# Patient Record
Sex: FEMALE | Race: WHITE | NOT HISPANIC OR LATINO
[De-identification: names, ages, dates, MRNs, and addresses within clinical notes are randomized per-mention and may not be internally consistent; named-entity substitution may affect disease eponyms.]

---

## 2023-01-17 ENCOUNTER — APPOINTMENT (OUTPATIENT)
Dept: ENDOCRINOLOGY | Facility: CLINIC | Age: 53
End: 2023-01-17
Payer: COMMERCIAL

## 2023-01-17 VITALS
BODY MASS INDEX: 24.66 KG/M2 | WEIGHT: 148 LBS | DIASTOLIC BLOOD PRESSURE: 69 MMHG | OXYGEN SATURATION: 100 % | HEART RATE: 73 BPM | HEIGHT: 65 IN | SYSTOLIC BLOOD PRESSURE: 109 MMHG

## 2023-01-17 DIAGNOSIS — N64.52 NIPPLE DISCHARGE: ICD-10-CM

## 2023-01-17 PROCEDURE — 99204 OFFICE O/P NEW MOD 45 MIN: CPT

## 2023-01-19 PROBLEM — N64.52 BREAST DISCHARGE: Status: ACTIVE | Noted: 2023-01-19

## 2023-12-04 ENCOUNTER — RESULT REVIEW (OUTPATIENT)
Age: 53
End: 2023-12-04

## 2023-12-04 ENCOUNTER — APPOINTMENT (OUTPATIENT)
Dept: ORTHOPEDIC SURGERY | Facility: CLINIC | Age: 53
End: 2023-12-04
Payer: COMMERCIAL

## 2023-12-04 VITALS — BODY MASS INDEX: 24.66 KG/M2 | WEIGHT: 148 LBS | HEIGHT: 65 IN

## 2023-12-04 DIAGNOSIS — S76.011A STRAIN OF MUSCLE, FASCIA AND TENDON OF RIGHT HIP, INITIAL ENCOUNTER: ICD-10-CM

## 2023-12-04 DIAGNOSIS — M22.41 CHONDROMALACIA PATELLAE, RIGHT KNEE: ICD-10-CM

## 2023-12-04 PROCEDURE — 73562 X-RAY EXAM OF KNEE 3: CPT | Mod: RT

## 2023-12-04 PROCEDURE — 99203 OFFICE O/P NEW LOW 30 MIN: CPT

## 2023-12-04 PROCEDURE — 73502 X-RAY EXAM HIP UNI 2-3 VIEWS: CPT

## 2023-12-06 ENCOUNTER — RESULT REVIEW (OUTPATIENT)
Age: 53
End: 2023-12-06

## 2023-12-13 ENCOUNTER — NON-APPOINTMENT (OUTPATIENT)
Age: 53
End: 2023-12-13

## 2023-12-19 ENCOUNTER — APPOINTMENT (OUTPATIENT)
Dept: ORTHOPEDIC SURGERY | Facility: CLINIC | Age: 53
End: 2023-12-19
Payer: COMMERCIAL

## 2023-12-19 VITALS — BODY MASS INDEX: 24.66 KG/M2 | HEIGHT: 65 IN | WEIGHT: 148 LBS

## 2023-12-19 DIAGNOSIS — S76.011D STRAIN OF MUSCLE, FASCIA AND TENDON OF RIGHT HIP, SUBSEQUENT ENCOUNTER: ICD-10-CM

## 2023-12-19 DIAGNOSIS — S80.01XD CONTUSION OF RIGHT KNEE, SUBSEQUENT ENCOUNTER: ICD-10-CM

## 2023-12-19 DIAGNOSIS — M17.11 UNILATERAL PRIMARY OSTEOARTHRITIS, RIGHT KNEE: ICD-10-CM

## 2023-12-19 PROCEDURE — 99214 OFFICE O/P EST MOD 30 MIN: CPT

## 2023-12-19 NOTE — HISTORY OF PRESENT ILLNESS
[Gradual] : gradual [Dull/Aching] : dull/aching [Intermittent] : intermittent [de-identified] : The patient says that in her hip and knee have resolved.  She has occasional mild stiffness in these areas.  She has been doing home exercises.  She had MRI right hip [] : Post Surgical Visit: no [de-identified] : 12/4/23 [de-identified] : Dr. Landis [de-identified] : MRI

## 2023-12-19 NOTE — DISCUSSION/SUMMARY
[de-identified] : MRI right hip was discussed with the patient She will gradually increase her activities She may gradually return to sports as tolerated She can apply Aquaphor to scar right anterior knee prn   Impression: Strain right hip Contusion right knee/mild osteoarthritis/chondromalacia patella

## 2023-12-19 NOTE — IMAGING
[de-identified] : Normal gait  Right hip No swelling No tenderness Full painless passive range of motion  Right knee Inspection-healed anterior abrasion measuring approximately 2 cm Palpation-mild medial facet tenderness Passive range of motion-0 degrees to 130 degrees Ligaments are stable Quad strength 5/5   Right leg No swelling Calf is soft and nontender Posterior tibial pulse 2+

## 2023-12-19 NOTE — DATA REVIEWED
[MRI] : MRI [Right] : of the right [Hip] : hip [FreeTextEntry1] : MRI right hip done at Dr. Hays December 6, 2023 was reviewed. There is slight trochanteric bursitis. Also reported as small bilateral hip effusions. Lower lumbar spondylosis. No fracture